# Patient Record
Sex: FEMALE | Race: BLACK OR AFRICAN AMERICAN | NOT HISPANIC OR LATINO | URBAN - METROPOLITAN AREA
[De-identification: names, ages, dates, MRNs, and addresses within clinical notes are randomized per-mention and may not be internally consistent; named-entity substitution may affect disease eponyms.]

---

## 2021-01-12 ENCOUNTER — CLAIMS CREATED FROM THE CLAIM WINDOW (OUTPATIENT)
Dept: URBAN - METROPOLITAN AREA CLINIC 86 | Facility: CLINIC | Age: 46
End: 2021-01-12
Payer: COMMERCIAL

## 2021-01-12 ENCOUNTER — DASHBOARD ENCOUNTERS (OUTPATIENT)
Age: 46
End: 2021-01-12

## 2021-01-12 DIAGNOSIS — Z79.899 HIGH RISK MEDICATION USE: ICD-10-CM

## 2021-01-12 DIAGNOSIS — R74.8 ABNORMAL LIVER ENZYMES: ICD-10-CM

## 2021-01-12 DIAGNOSIS — E66.3 OVERWEIGHT: ICD-10-CM

## 2021-01-12 DIAGNOSIS — D64.9 ANEMIA: ICD-10-CM

## 2021-01-12 DIAGNOSIS — Z71.89 VACCINE COUNSELING: ICD-10-CM

## 2021-01-12 DIAGNOSIS — D64.89 ANEMIA DUE TO OTHER CAUSE: ICD-10-CM

## 2021-01-12 PROBLEM — 238131007 OVERWEIGHT: Status: ACTIVE | Noted: 2021-01-12

## 2021-01-12 PROBLEM — 161646004 H/O: HIGH RISK MEDICATION: Status: ACTIVE | Noted: 2021-01-12

## 2021-01-12 PROBLEM — 409063005 COUNSELING: Status: ACTIVE | Noted: 2021-01-12

## 2021-01-12 PROBLEM — 271737000 ANEMIA: Status: ACTIVE | Noted: 2021-01-12

## 2021-01-12 PROCEDURE — 82104 ALPHA-1-ANTITRYPSIN PHENO: CPT | Performed by: PHYSICIAN ASSISTANT

## 2021-01-12 PROCEDURE — 82104 ALPHA-1-ANTITRYPSIN PHENO: CPT

## 2021-01-12 PROCEDURE — 99204 OFFICE O/P NEW MOD 45 MIN: CPT | Performed by: PHYSICIAN ASSISTANT

## 2021-01-12 PROCEDURE — 87522 HEPATITIS C REVRS TRNSCRPJ: CPT | Performed by: PHYSICIAN ASSISTANT

## 2021-01-12 PROCEDURE — 87522 HEPATITIS C REVRS TRNSCRPJ: CPT

## 2021-01-12 PROCEDURE — 99204 OFFICE O/P NEW MOD 45 MIN: CPT

## 2021-01-12 RX ORDER — FAMOTIDINE 20 MG/1
TABLET ORAL
Qty: 0 | Refills: 0 | Status: ACTIVE | COMMUNITY
Start: 1900-01-01

## 2021-01-12 NOTE — HPI-TODAY'S VISIT:
Pt here for abnormal lfts.  she was seen by derm and had labs done due to possibly starting accutane. she's seen pcp in past and noted to have normal lfts in the past.  she has been on accutane before and denies lfts elevated then.   she took a 10 day detox in nov 2020.    started flagyl after labs were done for x 7 days for BV, still on this. but labs were done prior to starting this.  took detox: https://[email or web address]-day-[email or web address]-body-[email or web address]-express-[email or web address].html in nov 2020, contains many herbals.   reccomend she contact pcp in regards to low h/h, states she takes iron prn for anemia. plt elevated so needs to restart iron and have pcp follow up. also noted to have low wbc and neutrophil count. ldl elevated, so needs to see if she needs treatement, can discuss with pcp.  1/6/21 labs ldl 111 ast 63 alt 102 wbc 3.3 hgb 10.6 low plt 461 hcg total <3  ldl elevated 111  Duration of visit 45 mins with over 50% of the time explaining patient's condition and treatment plan and reviewing records

## 2021-01-12 NOTE — PHYSICAL EXAM CONSTITUTIONAL:
well developed, well nourished , in no acute distress , ambulating without difficulty , normal communication ability  I will SWITCH the dose or number of times a day I take the medications listed below when I get home from the hospital:  None

## 2021-01-27 ENCOUNTER — TELEPHONE ENCOUNTER (OUTPATIENT)
Dept: URBAN - METROPOLITAN AREA CLINIC 92 | Facility: CLINIC | Age: 46
End: 2021-01-27

## 2021-02-09 ENCOUNTER — LAB OUTSIDE AN ENCOUNTER (OUTPATIENT)
Dept: URBAN - METROPOLITAN AREA CLINIC 86 | Facility: CLINIC | Age: 46
End: 2021-02-09

## 2021-02-11 ENCOUNTER — OFFICE VISIT (OUTPATIENT)
Dept: URBAN - METROPOLITAN AREA TELEHEALTH 2 | Facility: TELEHEALTH | Age: 46
End: 2021-02-11
